# Patient Record
Sex: MALE | Race: WHITE | URBAN - METROPOLITAN AREA
[De-identification: names, ages, dates, MRNs, and addresses within clinical notes are randomized per-mention and may not be internally consistent; named-entity substitution may affect disease eponyms.]

---

## 2018-03-12 ENCOUNTER — OFFICE VISIT (OUTPATIENT)
Dept: FAMILY MEDICINE CLINIC | Facility: CLINIC | Age: 23
End: 2018-03-12

## 2018-03-12 VITALS
TEMPERATURE: 98 F | SYSTOLIC BLOOD PRESSURE: 120 MMHG | OXYGEN SATURATION: 98 % | WEIGHT: 156.5 LBS | HEIGHT: 72 IN | RESPIRATION RATE: 14 BRPM | HEART RATE: 56 BPM | DIASTOLIC BLOOD PRESSURE: 64 MMHG | BODY MASS INDEX: 21.2 KG/M2

## 2018-03-12 DIAGNOSIS — Z00.00 HEALTH CARE MAINTENANCE: Primary | ICD-10-CM

## 2018-03-12 PROCEDURE — 99385 PREV VISIT NEW AGE 18-39: CPT | Performed by: FAMILY MEDICINE

## 2018-03-12 NOTE — PROGRESS NOTES
Chief Complaint:   Patient presents with:  Physical: Needs form filled out       HPI:   This is a 25year old male coming in for healthcare check. 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   Patient is traveling to [facility] and then some [facility] °F (36.6 °C) (Temporal)   Resp 14   Ht 72\"   Wt 156 lb 8 oz   SpO2 98%   BMI 21.23 kg/m²  Estimated body mass index is 21.23 kg/m² as calculated from the following:    Height as of this encounter: 72\". Weight as of this encounter: 156 lb 8 oz.    Vital of phalanx of finger     Closed fracture of phalanx of foot     Prepatellar bursitis     Hx of tonsillectomy      Georgia Morales MD  3/12/2018  9:18 AM

## 2019-06-26 ENCOUNTER — TELEPHONE (OUTPATIENT)
Dept: FAMILY MEDICINE CLINIC | Facility: CLINIC | Age: 24
End: 2019-06-26

## 2019-06-26 NOTE — TELEPHONE ENCOUNTER
Checked patient's consent sheet. Jennifer is not listed. Informed Jennifer of this. Informed her I will need to speak with Hasmukh Abdul himself. She verbalized understanding.